# Patient Record
Sex: FEMALE | Race: WHITE | NOT HISPANIC OR LATINO | URBAN - METROPOLITAN AREA
[De-identification: names, ages, dates, MRNs, and addresses within clinical notes are randomized per-mention and may not be internally consistent; named-entity substitution may affect disease eponyms.]

---

## 2021-04-08 ENCOUNTER — EMERGENCY (EMERGENCY)
Facility: HOSPITAL | Age: 36
LOS: 1 days | Discharge: HOME | End: 2021-04-08
Attending: EMERGENCY MEDICINE
Payer: MEDICAID

## 2021-04-08 VITALS
HEIGHT: 64 IN | HEART RATE: 72 BPM | SYSTOLIC BLOOD PRESSURE: 119 MMHG | RESPIRATION RATE: 18 BRPM | TEMPERATURE: 98 F | OXYGEN SATURATION: 98 % | DIASTOLIC BLOOD PRESSURE: 78 MMHG | WEIGHT: 153 LBS

## 2021-04-08 VITALS
RESPIRATION RATE: 18 BRPM | DIASTOLIC BLOOD PRESSURE: 74 MMHG | TEMPERATURE: 98 F | HEART RATE: 74 BPM | OXYGEN SATURATION: 100 % | SYSTOLIC BLOOD PRESSURE: 115 MMHG

## 2021-04-08 DIAGNOSIS — R20.0 ANESTHESIA OF SKIN: Chronic | ICD-10-CM

## 2021-04-08 LAB
ALBUMIN SERPL ELPH-MCNC: 3.7 G/DL — SIGNIFICANT CHANGE UP (ref 3.5–5)
ALP SERPL-CCNC: 55 U/L — SIGNIFICANT CHANGE UP (ref 40–120)
ALT FLD-CCNC: 23 U/L DA — SIGNIFICANT CHANGE UP (ref 10–60)
ANION GAP SERPL CALC-SCNC: 6 MMOL/L — SIGNIFICANT CHANGE UP (ref 5–17)
AST SERPL-CCNC: 15 U/L — SIGNIFICANT CHANGE UP (ref 10–40)
BASOPHILS # BLD AUTO: 0.02 K/UL — SIGNIFICANT CHANGE UP (ref 0–0.2)
BASOPHILS NFR BLD AUTO: 0.3 % — SIGNIFICANT CHANGE UP (ref 0–2)
BILIRUB SERPL-MCNC: 0.7 MG/DL — SIGNIFICANT CHANGE UP (ref 0.2–1.2)
BUN SERPL-MCNC: 9 MG/DL — SIGNIFICANT CHANGE UP (ref 7–18)
CALCIUM SERPL-MCNC: 8.4 MG/DL — SIGNIFICANT CHANGE UP (ref 8.4–10.5)
CHLORIDE SERPL-SCNC: 107 MMOL/L — SIGNIFICANT CHANGE UP (ref 96–108)
CO2 SERPL-SCNC: 23 MMOL/L — SIGNIFICANT CHANGE UP (ref 22–31)
CREAT SERPL-MCNC: 0.58 MG/DL — SIGNIFICANT CHANGE UP (ref 0.5–1.3)
EOSINOPHIL # BLD AUTO: 0.02 K/UL — SIGNIFICANT CHANGE UP (ref 0–0.5)
EOSINOPHIL NFR BLD AUTO: 0.3 % — SIGNIFICANT CHANGE UP (ref 0–6)
GLUCOSE SERPL-MCNC: 100 MG/DL — HIGH (ref 70–99)
HCG SERPL-ACNC: <1 MIU/ML — SIGNIFICANT CHANGE UP
HCT VFR BLD CALC: 38.6 % — SIGNIFICANT CHANGE UP (ref 34.5–45)
HGB BLD-MCNC: 13.2 G/DL — SIGNIFICANT CHANGE UP (ref 11.5–15.5)
IMM GRANULOCYTES NFR BLD AUTO: 0.3 % — SIGNIFICANT CHANGE UP (ref 0–1.5)
LYMPHOCYTES # BLD AUTO: 1.07 K/UL — SIGNIFICANT CHANGE UP (ref 1–3.3)
LYMPHOCYTES # BLD AUTO: 17 % — SIGNIFICANT CHANGE UP (ref 13–44)
MAGNESIUM SERPL-MCNC: 2.1 MG/DL — SIGNIFICANT CHANGE UP (ref 1.6–2.6)
MCHC RBC-ENTMCNC: 30.6 PG — SIGNIFICANT CHANGE UP (ref 27–34)
MCHC RBC-ENTMCNC: 34.2 GM/DL — SIGNIFICANT CHANGE UP (ref 32–36)
MCV RBC AUTO: 89.6 FL — SIGNIFICANT CHANGE UP (ref 80–100)
MONOCYTES # BLD AUTO: 0.25 K/UL — SIGNIFICANT CHANGE UP (ref 0–0.9)
MONOCYTES NFR BLD AUTO: 4 % — SIGNIFICANT CHANGE UP (ref 2–14)
NEUTROPHILS # BLD AUTO: 4.91 K/UL — SIGNIFICANT CHANGE UP (ref 1.8–7.4)
NEUTROPHILS NFR BLD AUTO: 78.1 % — HIGH (ref 43–77)
NRBC # BLD: 0 /100 WBCS — SIGNIFICANT CHANGE UP (ref 0–0)
PLATELET # BLD AUTO: 208 K/UL — SIGNIFICANT CHANGE UP (ref 150–400)
POTASSIUM SERPL-MCNC: 5 MMOL/L — SIGNIFICANT CHANGE UP (ref 3.5–5.3)
POTASSIUM SERPL-SCNC: 5 MMOL/L — SIGNIFICANT CHANGE UP (ref 3.5–5.3)
PROT SERPL-MCNC: 6.7 G/DL — SIGNIFICANT CHANGE UP (ref 6–8.3)
RBC # BLD: 4.31 M/UL — SIGNIFICANT CHANGE UP (ref 3.8–5.2)
RBC # FLD: 12.1 % — SIGNIFICANT CHANGE UP (ref 10.3–14.5)
SODIUM SERPL-SCNC: 136 MMOL/L — SIGNIFICANT CHANGE UP (ref 135–145)
WBC # BLD: 6.29 K/UL — SIGNIFICANT CHANGE UP (ref 3.8–10.5)
WBC # FLD AUTO: 6.29 K/UL — SIGNIFICANT CHANGE UP (ref 3.8–10.5)

## 2021-04-08 PROCEDURE — 96375 TX/PRO/DX INJ NEW DRUG ADDON: CPT

## 2021-04-08 PROCEDURE — 96365 THER/PROPH/DIAG IV INF INIT: CPT

## 2021-04-08 PROCEDURE — 99284 EMERGENCY DEPT VISIT MOD MDM: CPT

## 2021-04-08 PROCEDURE — 85025 COMPLETE CBC W/AUTO DIFF WBC: CPT

## 2021-04-08 PROCEDURE — 83735 ASSAY OF MAGNESIUM: CPT

## 2021-04-08 PROCEDURE — 36415 COLL VENOUS BLD VENIPUNCTURE: CPT

## 2021-04-08 PROCEDURE — 80053 COMPREHEN METABOLIC PANEL: CPT

## 2021-04-08 PROCEDURE — 84702 CHORIONIC GONADOTROPIN TEST: CPT

## 2021-04-08 PROCEDURE — 99284 EMERGENCY DEPT VISIT MOD MDM: CPT | Mod: 25

## 2021-04-08 RX ORDER — SODIUM CHLORIDE 9 MG/ML
1000 INJECTION INTRAMUSCULAR; INTRAVENOUS; SUBCUTANEOUS
Refills: 0 | Status: DISCONTINUED | OUTPATIENT
Start: 2021-04-08 | End: 2021-04-11

## 2021-04-08 RX ORDER — METOCLOPRAMIDE HCL 10 MG
10 TABLET ORAL ONCE
Refills: 0 | Status: COMPLETED | OUTPATIENT
Start: 2021-04-08 | End: 2021-04-08

## 2021-04-08 RX ORDER — DIPHENHYDRAMINE HCL 50 MG
25 CAPSULE ORAL ONCE
Refills: 0 | Status: COMPLETED | OUTPATIENT
Start: 2021-04-08 | End: 2021-04-08

## 2021-04-08 RX ADMIN — Medication 10 MILLIGRAM(S): at 14:15

## 2021-04-08 RX ADMIN — Medication 25 MILLIGRAM(S): at 13:45

## 2021-04-08 RX ADMIN — Medication 104 MILLIGRAM(S): at 13:45

## 2021-04-08 RX ADMIN — SODIUM CHLORIDE 150 MILLILITER(S): 9 INJECTION INTRAMUSCULAR; INTRAVENOUS; SUBCUTANEOUS at 13:44

## 2021-04-08 NOTE — ED PROVIDER NOTE - PATIENT PORTAL LINK FT
You can access the FollowMyHealth Patient Portal offered by [facility] by registering at the following website: [email or web address]. By joining [facility]’s FollowMyHealth portal, you will also be able to view your health information using other applications (apps) compatible with our system.

## 2021-04-08 NOTE — ED PROVIDER NOTE - CPE EDP HEME LYMPH NORM
"  Subjective    Humberto Colvin is a 51 y.o. female. she is here for follow up     History of Present Illness        Primary Care Provider     POORNIMA Pina     Duration 10 years     Timing - Diabetes is Constant     Quality -  poorly controlled     Severity -  severe     Complications - peripheral neuropathy     Current symptoms/problems  increase appetite, paresthesia of the feet, polydipsia and polyuria      Alleviating Factors: Compliance       Side Effects  none     Current diet  in general, an \"unhealthy\" diet     Current exercise none     Current monitoring regimen: home blood tests - 3 times daily  Home blood sugar records: 50 to 500     Hypoglycemia yes     The following portions of the patient's history were reviewed and updated as appropriate:   Past Medical History:   Diagnosis Date   • Abdominal pain, epigastric    • Abrasion     an d/or friction burn   • Abrasion     and/or friction burn of hand, INFECTED   • Acquired hypothyroidism    • Acute bronchitis    • Acute hypokalemia    • Acute otitis media     right   • Acute sinusitis    • Acute vaginitis    • Anemia    • Aphthous ulceration     of skin and/or mucous membrane      • Asthma    • Astigmatism    • Backache     chronic   • Benign neoplasm of choroid     OS   • Candidiasis     skin and vagina   • Carpal tunnel syndrome    • Cellulitis of foot    • Cobalamin deficiency    • COLD (chronic obstructive lung disease)    • Cough    • Cyst of Bartholin's gland duct    • Diabetes mellitus     no retinopathy   • Diarrhea    • Disorder     Disorder due to type 2 diabetes mellitus     • Dyslipidemia    • Dysphagia    • Dyspnea    • Dysuria    • Encounter for gynecological examination with abnormal finding    • Encounter for screening mammogram for malignant neoplasm of breast    • Epigastric pain    • Essential hypertension    • Fatigue    • Gastritis    • Generalized abdominal pain    • GERD (gastroesophageal reflux disease)    • GI " allergy to food      cow milk, shrimp, sesame seed   • HA (headache)    • Hashimoto's thyroiditis    • Hearing loss    • Heartburn    • Herpes labialis    • History of chest pain    • Hyperlipidemia    • Hypertensive disorder    • Influenza-like illness    • Insomnia    • Jaw pain    • Maculopapular rash     eruption   • Menopause    • Myopia    • Nasal vestibulitis    • Nausea and vomiting    • Neck pain     chronic   • Need for influenza vaccination    • Neurologic disorder associated with diabetes mellitus    • Neuropathy    • Obesity    • Pain in lower limb    • Pain in wrist    • Patient currently pregnant     - G 2, P 1, 0-1-1   • Peptic stricture of esophagus    • Prescription refill     renewal   • Proteinuria    • Pruritus of vagina    • Restless legs    • Right upper quadrant pain    • Serous otitis media    • Skin lesion    • Sleep apnea    • Stricture of esophagus    • Symptomatic menopausal or female climacteric states    • Tendinitis    • Tobacco use     and exposure   • Type 2 diabetes mellitus    • Upper respiratory infection    • Vomiting      Past Surgical History:   Procedure Laterality Date   • CARDIAC CATHETERIZATION  05/23/2013    No significant epicardial coronary artery disease. Normal left ventricular systolic function.   • CHOLECYSTECTOMY     • COLONOSCOPY  07/18/2014    internal & external hemorrhoids found.   • ENDOSCOPY  10/19/2015    Esophageal stricture was present.Dilatation performed.Normal stomach.Normal duodenum.   • ENDOSCOPY  07/18/2014    Esophageal stricture present. Dilatation performed. Gastritis in stomach. Biospy taken. Normal duodenum.   • EXCISION LESION      Back/Flank Lipoma removed from back   • INJECTION OF MEDICATION  11/09/2015    B12   • INJECTION OF MEDICATION  06/09/2015    Kenalog   • PAP SMEAR  03/30/2011    Negative   • TOTAL ABDOMINAL HYSTERECTOMY WITH SALPINGO OOPHORECTOMY     • VAGINAL DELIVERY      x1     Family History   Problem Relation Age of Onset   •  Diabetes Mother    • Hypertension Mother    • Other Mother      respiratory disorder   • Diabetes Father    • Heart disease Father    • Hypertension Father    • Lung cancer Father    • Diabetes Paternal Grandmother    • Hypertension Paternal Grandmother    • Hypertension Paternal Grandfather    • Diabetes Paternal Grandfather    • Stroke Other      OB History      Para Term  AB Living    2 1   1     SAB TAB Ectopic Multiple Live Births    1            Current Outpatient Prescriptions   Medication Sig Dispense Refill   • aspirin 81 MG chewable tablet 1 PO QD for cardiovascular protection     • budesonide-formoterol (SYMBICORT) 160-4.5 MCG/ACT inhaler 2 inhalations BID for COPD.  Rinse mouth after each use.     • citalopram (CeleXA) 20 MG tablet 1 tab PO QHS for anxiety & depression     • diltiaZEM CD (CARDIZEM CD) 180 MG 24 hr capsule 1 cap PO QD for blood pressure and heart rate     • Dulaglutide (TRULICITY) 0.75 MG/0.5ML solution pen-injector Inject subQ in the abdomen, thigh or upper arm any time of the day without regard to food 1 x weekly for diabetes     • estradiol (ESTRACE) 0.5 MG tablet 1 tab PO QD for HRT     • ezetimibe (ZETIA) 10 MG tablet 1 tab PO QD for cholesterol     • fexofenadine (ALLEGRA) 180 MG tablet 1 tab PO QD prn allergy symptoms     • fluticasone (FLONASE) 50 MCG/ACT nasal spray Instill 2 sprays in each nostril QD for nasal congestion/drainage     • furosemide (LASIX) 20 MG tablet 1 tab PO QAM.  May take 1 extra dose in afternoon for weight gain 2 # or more from previous day.     • gabapentin (NEURONTIN) 300 MG capsule 1 cap PO QD for diabetic peripheral neuropathy     • ipratropium-albuterol (DUO-NEB) 0.5-2.5 mg/mL nebulizer Inhale contents of 1 vial Q6H prn shortness of air/wheeze/persistent cough     • levothyroxine (SYNTHROID, LEVOTHROID) 100 MCG tablet 1 tab PO QAM on empty stomach 1 hour before meals/meds     • lisinopril (PRINIVIL,ZESTRIL) 10 MG tablet 1 tab PO QD for BP  & kidney protection     • metFORMIN ER (GLUCOPHAGE-XR) 500 MG 24 hr tablet 2 tabs PO BID w breakfast & supper for diabetes     • montelukast (SINGULAIR) 10 MG tablet 1 tab PO QHS for asthma     • pramipexole (MIRAPEX) 0.5 MG tablet 1 tab PO QHS for restless legs syndrome     • Vitamin D, Cholecalciferol, 1000 units capsule 1 cap PO 2 x weekly for Vitamin D replacement     • albuterol (PROAIR HFA) 108 (90 BASE) MCG/ACT inhaler Inhale 2 puffs 4 (four) times a day as needed for wheezing.     • albuterol (VENTOLIN HFA) 108 (90 BASE) MCG/ACT inhaler      • ASPIRIN LOW DOSE PO Take  by mouth daily.     • azithromycin (ZITHROMAX Z-MYKE) 250 MG tablet Take 2 tablets the first day, then 1 tablet daily for 4 days. 6 tablet 0   • Blood Glucose Monitoring Suppl (FREESTYLE LITE) device      • citalopram (CeleXA) 20 MG tablet      • clotrimazole-betamethasone (LOTRISONE) 1-0.05 % cream Apply  topically 2 (two) times a day. 45 g 0   • diltiaZEM CD (CARDIZEM CD) 180 MG 24 hr capsule TAKE ONE CAPSULE BY MOUTH IN THE MORNING 30 capsule 5   • Dulaglutide (TRULICITY) 1.5 MG/0.5ML solution pen-injector Inject  under the skin.     • EASY TOUCH PEN NEEDLES 31G X 6 MM misc      • estradiol (ESTRACE) 0.5 MG tablet Take 1 tablet by mouth daily. 30 tablet 11   • fexofenadine (ALLEGRA) 180 MG tablet      • fluticasone (FLONASE) 50 MCG/ACT nasal spray      • furosemide (LASIX) 20 MG tablet Take 20 mg by mouth daily.     • gemfibrozil (LOPID) 600 MG tablet      • glucose blood (ACCU-CHEK GEOVANNA PLUS) test strip 1 each by Other route 3 (three) times a day. Use as instructed     • HUMULIN R 500 UNIT/ML CONCENTRATED injection      • Insulin Pen Needle (NOVOFINE) 30G X 8 MM misc As directed     • Insulin Regular Human, Conc, (HUMULIN R U-500 KWIKPEN) 500 UNIT/ML solution pen-injector CONCENTRATED injection Inject 150 units three times daily   If pen not covered used vials 3 pen 11   • ipratropium-albuterol (DUO-NEB) 0.5-2.5 mg/mL nebulizer      •  IPRATROPIUM-ALBUTEROL IN Inhale.     • Lancets (ACCU-CHEK SAFE-T PRO) lancets 1 each by Other route 3 (three) times a day. 23 gauge     • levothyroxine (SYNTHROID, LEVOTHROID) 100 MCG tablet Take 100 mcg by mouth daily.     • lisinopril (PRINIVIL,ZESTRIL) 10 MG tablet Take 10 mg by mouth daily.     • metFORMIN XR (GLUCOPHAGE-XR) 500 MG 24 hr tablet Take 1,000 mg by mouth 2 (two) times a day.     • miconazole (MICONAZOLE 7) 2 % vaginal cream      • montelukast (SINGULAIR) 10 MG tablet      • nystatin (MYCOSTATIN) 811217 UNIT/GM powder Apply  topically 3 (Three) Times a Day. 180 g 1   • omeprazole (PriLOSEC) 20 MG capsule Take 20 mg by mouth 2 (two) times a day.     • potassium chloride ER (K-TAB) 20 MEQ tablet controlled-release ER tablet      • pramipexole (MIRAPEX) 0.5 MG tablet Take 1 tablet by mouth every night.     • ranitidine (ZANTAC) 150 MG tablet Take 150 mg by mouth 2 (two) times a day.     • SYMBICORT 160-4.5 MCG/ACT inhaler Take 1 puff by mouth 2 (two) times a day.     • vitamin D (ERGOCALCIFEROL) 33900 UNITS capsule capsule Take 1 capsule by mouth 1 (one) time per week.     • ZETIA 10 MG tablet Take 1 tablet by mouth every night.     • zolpidem (AMBIEN) 10 MG tablet Take 1 tablet by mouth every night.       No current facility-administered medications for this visit.      Allergies   Allergen Reactions   • Betagen [Povidone Iodine]    • Celebrex [Celecoxib]    • Penicillins      Social History     Social History   • Marital status:      Spouse name: N/A   • Number of children: N/A   • Years of education: N/A     Social History Main Topics   • Smoking status: Former Smoker   • Smokeless tobacco: Never Used   • Alcohol use No   • Drug use: No   • Sexual activity: No     Other Topics Concern   • None     Social History Narrative       Review of Systems  Review of Systems   Constitutional: Negative for activity change, appetite change, diaphoresis and fatigue.   HENT: Negative for congestion, dental  "problem, facial swelling, sneezing, sore throat, tinnitus, trouble swallowing and voice change.    Eyes: Negative for photophobia, pain, discharge, redness, itching and visual disturbance.   Respiratory: Negative for apnea, cough, choking, chest tightness and shortness of breath.    Cardiovascular: Negative for chest pain, palpitations and leg swelling.   Gastrointestinal: Negative for abdominal distention, abdominal pain, constipation, diarrhea, nausea and vomiting.   Endocrine: Negative for cold intolerance, heat intolerance, polydipsia, polyphagia and polyuria.   Genitourinary: Negative for difficulty urinating, dysuria, frequency, hematuria and urgency.   Musculoskeletal: Negative for arthralgias, back pain, gait problem, joint swelling, myalgias, neck pain and neck stiffness.   Skin: Negative for color change, pallor, rash and wound.   Allergic/Immunologic: Negative for environmental allergies, food allergies and immunocompromised state.   Neurological: Negative for dizziness, tremors, facial asymmetry, weakness, light-headedness, numbness and headaches.   Hematological: Negative for adenopathy. Does not bruise/bleed easily.   Psychiatric/Behavioral: Negative for agitation, behavioral problems, confusion and sleep disturbance.        Objective    /76 (BP Location: Left arm, Patient Position: Sitting, Cuff Size: Adult)  Pulse 99  Ht 59\" (149.9 cm)  Wt 188 lb 11.2 oz (85.6 kg)  BMI 38.11 kg/m2  Physical Exam   Constitutional: She is oriented to person, place, and time. She appears well-developed and well-nourished. No distress.   HENT:   Head: Normocephalic and atraumatic.   Right Ear: External ear normal.   Left Ear: External ear normal.   Nose: Nose normal.   Eyes: Conjunctivae and EOM are normal. Pupils are equal, round, and reactive to light.   Neck: Normal range of motion. Neck supple. No tracheal deviation present. No thyromegaly present.   Cardiovascular: Normal rate, regular rhythm and normal " heart sounds.    No murmur heard.  Pulmonary/Chest: Effort normal and breath sounds normal. No respiratory distress. She has no wheezes.   Abdominal: Soft. Bowel sounds are normal. There is no tenderness. There is no rebound and no guarding.   Musculoskeletal: Normal range of motion. She exhibits no edema, tenderness or deformity.   Neurological: She is alert and oriented to person, place, and time. No cranial nerve deficit.   Skin: Skin is warm and dry. No rash noted.   Psychiatric: She has a normal mood and affect. Her behavior is normal. Judgment and thought content normal.       Lab Review  Glucose (mg/dl)   Date Value   09/18/2015 174 (H)   09/08/2015 264 (H)   08/31/2015 297 (H)     Sodium (mmol/L)   Date Value   09/18/2015 138.0   09/08/2015 139.0   08/31/2015 138.0     Potassium (mmol/L)   Date Value   09/18/2015 3.7   09/08/2015 3.2 (L)   08/31/2015 3.8     Chloride (mmol/L)   Date Value   09/18/2015 106.0   09/08/2015 102.0   08/31/2015 100.0     CO2 (mmol/L)   Date Value   09/18/2015 19.0 (L)   09/08/2015 27.0   08/31/2015 27.0     BUN (mg/dl)   Date Value   09/18/2015 9   09/08/2015 12   08/31/2015 16     Creatinine (mg/dl)   Date Value   09/18/2015 0.7   09/08/2015 0.7   08/31/2015 0.7     Hemoglobin A1C (%TotHgb)   Date Value   09/08/2015 8.0 (H)   08/31/2015 7.8 (H)   05/26/2015 9.4 (H)     Triglycerides (mg/dl)   Date Value   09/08/2015 521 (H)   08/31/2015 701 (H)   05/26/2015 446 (H)       Assessment/Plan      1. Type 2 diabetes mellitus without complication, with long-term current use of insulin    2. Mixed diabetic hyperlipidemia associated with type 2 diabetes mellitus    3. Hypertension associated with diabetes    4. Vitamin D deficiency    5. Numbness of fingers of both hands    .    Medications prescribed:  Outpatient Encounter Prescriptions as of 10/2/2017   Medication Sig Dispense Refill   • aspirin 81 MG chewable tablet 1 PO QD for cardiovascular protection     • budesonide-formoterol  (SYMBICORT) 160-4.5 MCG/ACT inhaler 2 inhalations BID for COPD.  Rinse mouth after each use.     • citalopram (CeleXA) 20 MG tablet 1 tab PO QHS for anxiety & depression     • diltiaZEM CD (CARDIZEM CD) 180 MG 24 hr capsule 1 cap PO QD for blood pressure and heart rate     • Dulaglutide (TRULICITY) 0.75 MG/0.5ML solution pen-injector Inject subQ in the abdomen, thigh or upper arm any time of the day without regard to food 1 x weekly for diabetes     • estradiol (ESTRACE) 0.5 MG tablet 1 tab PO QD for HRT     • ezetimibe (ZETIA) 10 MG tablet 1 tab PO QD for cholesterol     • fexofenadine (ALLEGRA) 180 MG tablet 1 tab PO QD prn allergy symptoms     • fluticasone (FLONASE) 50 MCG/ACT nasal spray Instill 2 sprays in each nostril QD for nasal congestion/drainage     • furosemide (LASIX) 20 MG tablet 1 tab PO QAM.  May take 1 extra dose in afternoon for weight gain 2 # or more from previous day.     • gabapentin (NEURONTIN) 300 MG capsule 1 cap PO QD for diabetic peripheral neuropathy     • ipratropium-albuterol (DUO-NEB) 0.5-2.5 mg/mL nebulizer Inhale contents of 1 vial Q6H prn shortness of air/wheeze/persistent cough     • levothyroxine (SYNTHROID, LEVOTHROID) 100 MCG tablet 1 tab PO QAM on empty stomach 1 hour before meals/meds     • lisinopril (PRINIVIL,ZESTRIL) 10 MG tablet 1 tab PO QD for BP & kidney protection     • metFORMIN ER (GLUCOPHAGE-XR) 500 MG 24 hr tablet 2 tabs PO BID w breakfast & supper for diabetes     • montelukast (SINGULAIR) 10 MG tablet 1 tab PO QHS for asthma     • pramipexole (MIRAPEX) 0.5 MG tablet 1 tab PO QHS for restless legs syndrome     • Vitamin D, Cholecalciferol, 1000 units capsule 1 cap PO 2 x weekly for Vitamin D replacement     • albuterol (PROAIR HFA) 108 (90 BASE) MCG/ACT inhaler Inhale 2 puffs 4 (four) times a day as needed for wheezing.     • albuterol (VENTOLIN HFA) 108 (90 BASE) MCG/ACT inhaler      • ASPIRIN LOW DOSE PO Take  by mouth daily.     • azithromycin (ZITHROMAX Z-MYKE)  250 MG tablet Take 2 tablets the first day, then 1 tablet daily for 4 days. 6 tablet 0   • Blood Glucose Monitoring Suppl (FREESTYLE LITE) device      • citalopram (CeleXA) 20 MG tablet      • clotrimazole-betamethasone (LOTRISONE) 1-0.05 % cream Apply  topically 2 (two) times a day. 45 g 0   • diltiaZEM CD (CARDIZEM CD) 180 MG 24 hr capsule TAKE ONE CAPSULE BY MOUTH IN THE MORNING 30 capsule 5   • Dulaglutide (TRULICITY) 1.5 MG/0.5ML solution pen-injector Inject  under the skin.     • EASY TOUCH PEN NEEDLES 31G X 6 MM misc      • estradiol (ESTRACE) 0.5 MG tablet Take 1 tablet by mouth daily. 30 tablet 11   • fexofenadine (ALLEGRA) 180 MG tablet      • fluticasone (FLONASE) 50 MCG/ACT nasal spray      • furosemide (LASIX) 20 MG tablet Take 20 mg by mouth daily.     • gemfibrozil (LOPID) 600 MG tablet      • glucose blood (ACCU-CHEK GEOVANNA PLUS) test strip 1 each by Other route 3 (three) times a day. Use as instructed     • HUMULIN R 500 UNIT/ML CONCENTRATED injection      • Insulin Pen Needle (NOVOFINE) 30G X 8 MM misc As directed     • Insulin Regular Human, Conc, (HUMULIN R U-500 KWIKPEN) 500 UNIT/ML solution pen-injector CONCENTRATED injection Inject 150 units three times daily   If pen not covered used vials 3 pen 11   • ipratropium-albuterol (DUO-NEB) 0.5-2.5 mg/mL nebulizer      • IPRATROPIUM-ALBUTEROL IN Inhale.     • Lancets (ACCU-CHEK SAFE-T PRO) lancets 1 each by Other route 3 (three) times a day. 23 gauge     • levothyroxine (SYNTHROID, LEVOTHROID) 100 MCG tablet Take 100 mcg by mouth daily.     • lisinopril (PRINIVIL,ZESTRIL) 10 MG tablet Take 10 mg by mouth daily.     • metFORMIN XR (GLUCOPHAGE-XR) 500 MG 24 hr tablet Take 1,000 mg by mouth 2 (two) times a day.     • miconazole (MICONAZOLE 7) 2 % vaginal cream      • montelukast (SINGULAIR) 10 MG tablet      • nystatin (MYCOSTATIN) 787902 UNIT/GM powder Apply  topically 3 (Three) Times a Day. 180 g 1   • omeprazole (PriLOSEC) 20 MG capsule Take 20 mg by  mouth 2 (two) times a day.     • potassium chloride ER (K-TAB) 20 MEQ tablet controlled-release ER tablet      • pramipexole (MIRAPEX) 0.5 MG tablet Take 1 tablet by mouth every night.     • ranitidine (ZANTAC) 150 MG tablet Take 150 mg by mouth 2 (two) times a day.     • SYMBICORT 160-4.5 MCG/ACT inhaler Take 1 puff by mouth 2 (two) times a day.     • vitamin D (ERGOCALCIFEROL) 28392 UNITS capsule capsule Take 1 capsule by mouth 1 (one) time per week.     • ZETIA 10 MG tablet Take 1 tablet by mouth every night.     • zolpidem (AMBIEN) 10 MG tablet Take 1 tablet by mouth every night.       No facility-administered encounter medications on file as of 10/2/2017.        Orders placed during this encounter include:  No orders of the defined types were placed in this encounter.      Glycemic Management     Crispin sensor     August 14 - 28 , 2017     Average bg 164     4 low events in the middle of the night     Decrease supper dose         Insulin resistant diabetes with lipodystrophic features      Stop januvia, tresiba, novolog and for now invokana     ---     Keep trulicity and metformin        Start u500 insulin 130 for bkfast and 85 for supper-- now 150 breakfast and 115 for supper ----    Keep 150 for breakfast       Decrease supper to 110      If low sugar , less than 80 at lunch or supper, decrease bkfast dose to 120     If low sugar, less than 80 , at bedtime, or before bkfast, decrease evening dose to 75     ---        If after 1 week the majority of lunch and supper readings are more than 200 - increase bkfast dose by 10     If the majority of supper and before bkfast readings are more than 200 - increase supper dose by 10           Lipid Management          Lab Results   Component Value Date     TRIG 521 (H) 09/08/2015     TRIG 701 (H) 08/31/2015     TRIG 446 (H) 05/26/2015            Lab Results   Component Value Date     HDL 41.0 09/08/2015     HDL 44.0 08/31/2015     HDL 52.0 05/26/2015           Only  zetia, intolerant to statins     Blood Pressure Management:        Vitals:     08/14/17 1343   BP: 126/72   Pulse: 118            Lab Results   Component Value Date     GLUCOSE 174 (H) 09/18/2015     CALCIUM 9.1 09/18/2015     .0 09/18/2015     K 3.7 09/18/2015     CO2 19.0 (L) 09/18/2015     .0 09/18/2015     BUN 9 09/18/2015     CREATININE 0.7 09/18/2015     ANIONGAP 13.0 09/18/2015                     Microvascular Complication Monitoring:       Eye Exam Evaluation, within 1 year      -----------     Last Microalbumin-Proteinuria Assessment     No results found for: MALBCRERATIO     No results found for: UTPCR     -----------        Neuropathy, yes       Refer to Neurology     For numbness in bilateral hands                       Weight Related:            Diet interventions: moderate (500 kCal/d) deficit diet.        Bone Health           Lab Results   Component Value Date     CALCIUM 9.1 09/18/2015         Thyroid Health           Lab Results   Component Value Date     TSH 0.89 09/08/2015     TSH 1.90 05/26/2015     TSH 5.86 (H) 05/09/2015               Lab Results   Component Value Date     FREET4 1.53 09/08/2015     FREET4 1.34 05/26/2015     FREET4 1.62 07/08/2014            Other Diabetes Related Aspects               Lab Results   Component Value Date     DSNYTSLZ52 203 (L) 09/18/2015         Start b12      Last celiac panel            Lab Results   Component Value Date     GDPABIGA 4 07/08/2014     TTRANSGLIGA <2 07/08/2014         .    4. Follow-up: Return in about 6 weeks (around 11/13/2017) for Recheck.                    normal...

## 2021-04-08 NOTE — ED PROVIDER NOTE - ENMT, MLM
Airway patent, Nasal mucosa clear. Mouth with normal mucosa. Throat has no vesicles, no oropharyngeal exudates and uvula is midline.  Sinus- NT to percussion
14-Jan-2019 17:02

## 2021-04-08 NOTE — ED PROVIDER NOTE - PROGRESS NOTE DETAILS
pt's feeling much better, will d/c home pt's feeling much better, will d/c home.  Pt states she will f/u with her neurologist

## 2021-04-08 NOTE — ED PROVIDER NOTE - OBJECTIVE STATEMENT
35 y.o. female LMP 3/12, c/o constant woke up with Lt sided HA, dizziness, photophobia, nausea, vomited, no fever, no fever.  Pt with similar HA for past 1 yr.  prior to her menstruation.  Pt took Tylenol with no relief.

## 2022-11-09 ENCOUNTER — NON-APPOINTMENT (OUTPATIENT)
Age: 37
End: 2022-11-09

## 2022-11-22 ENCOUNTER — NON-APPOINTMENT (OUTPATIENT)
Age: 37
End: 2022-11-22

## 2022-12-11 ENCOUNTER — NON-APPOINTMENT (OUTPATIENT)
Age: 37
End: 2022-12-11